# Patient Record
Sex: FEMALE | Race: BLACK OR AFRICAN AMERICAN | NOT HISPANIC OR LATINO
[De-identification: names, ages, dates, MRNs, and addresses within clinical notes are randomized per-mention and may not be internally consistent; named-entity substitution may affect disease eponyms.]

---

## 2019-04-16 ENCOUNTER — APPOINTMENT (OUTPATIENT)
Dept: ENDOCRINOLOGY | Facility: CLINIC | Age: 29
End: 2019-04-16

## 2020-04-26 ENCOUNTER — MESSAGE (OUTPATIENT)
Age: 30
End: 2020-04-26

## 2022-04-11 ENCOUNTER — INPATIENT (INPATIENT)
Facility: HOSPITAL | Age: 32
LOS: 0 days | Discharge: HOME | End: 2022-04-12
Attending: SURGERY | Admitting: SURGERY
Payer: COMMERCIAL

## 2022-04-11 ENCOUNTER — TRANSCRIPTION ENCOUNTER (OUTPATIENT)
Age: 32
End: 2022-04-11

## 2022-04-11 VITALS
HEART RATE: 121 BPM | DIASTOLIC BLOOD PRESSURE: 78 MMHG | OXYGEN SATURATION: 100 % | SYSTOLIC BLOOD PRESSURE: 121 MMHG | RESPIRATION RATE: 16 BRPM | TEMPERATURE: 97 F

## 2022-04-11 PROCEDURE — 99285 EMERGENCY DEPT VISIT HI MDM: CPT

## 2022-04-11 RX ORDER — KETOROLAC TROMETHAMINE 30 MG/ML
30 SYRINGE (ML) INJECTION ONCE
Refills: 0 | Status: DISCONTINUED | OUTPATIENT
Start: 2022-04-11 | End: 2022-04-11

## 2022-04-11 RX ORDER — SODIUM CHLORIDE 9 MG/ML
1000 INJECTION INTRAMUSCULAR; INTRAVENOUS; SUBCUTANEOUS ONCE
Refills: 0 | Status: COMPLETED | OUTPATIENT
Start: 2022-04-11 | End: 2022-04-11

## 2022-04-11 NOTE — ED ADULT TRIAGE NOTE - CHIEF COMPLAINT QUOTE
Pt arrives with diffuse R sided abd pain and R flank pain x1 day with associated vomiting. Sent by PCP for concern for appendicitis. Denies fevers, chills, urinary symptoms. LMP 3/30. Denies PMHx.

## 2022-04-12 ENCOUNTER — TRANSCRIPTION ENCOUNTER (OUTPATIENT)
Age: 32
End: 2022-04-12

## 2022-04-12 ENCOUNTER — RESULT REVIEW (OUTPATIENT)
Age: 32
End: 2022-04-12

## 2022-04-12 VITALS
OXYGEN SATURATION: 97 % | DIASTOLIC BLOOD PRESSURE: 68 MMHG | HEART RATE: 86 BPM | RESPIRATION RATE: 18 BRPM | SYSTOLIC BLOOD PRESSURE: 123 MMHG

## 2022-04-12 DIAGNOSIS — K35.80 UNSPECIFIED ACUTE APPENDICITIS: ICD-10-CM

## 2022-04-12 LAB
ALBUMIN SERPL ELPH-MCNC: 4 G/DL — SIGNIFICANT CHANGE UP (ref 3.3–5)
ALP SERPL-CCNC: 57 U/L — SIGNIFICANT CHANGE UP (ref 40–120)
ALT FLD-CCNC: 15 U/L — SIGNIFICANT CHANGE UP (ref 4–33)
ANION GAP SERPL CALC-SCNC: 9 MMOL/L — SIGNIFICANT CHANGE UP (ref 7–14)
APPEARANCE UR: CLEAR — SIGNIFICANT CHANGE UP
APTT BLD: 30.6 SEC — SIGNIFICANT CHANGE UP (ref 27–36.3)
AST SERPL-CCNC: 14 U/L — SIGNIFICANT CHANGE UP (ref 4–32)
B PERT DNA SPEC QL NAA+PROBE: SIGNIFICANT CHANGE UP
B PERT+PARAPERT DNA PNL SPEC NAA+PROBE: SIGNIFICANT CHANGE UP
BACTERIA # UR AUTO: NEGATIVE — SIGNIFICANT CHANGE UP
BASOPHILS # BLD AUTO: 0.03 K/UL — SIGNIFICANT CHANGE UP (ref 0–0.2)
BASOPHILS NFR BLD AUTO: 0.2 % — SIGNIFICANT CHANGE UP (ref 0–2)
BILIRUB SERPL-MCNC: 0.9 MG/DL — SIGNIFICANT CHANGE UP (ref 0.2–1.2)
BILIRUB UR-MCNC: NEGATIVE — SIGNIFICANT CHANGE UP
BLD GP AB SCN SERPL QL: NEGATIVE — SIGNIFICANT CHANGE UP
BORDETELLA PARAPERTUSSIS (RAPRVP): SIGNIFICANT CHANGE UP
BUN SERPL-MCNC: 7 MG/DL — SIGNIFICANT CHANGE UP (ref 7–23)
C PNEUM DNA SPEC QL NAA+PROBE: SIGNIFICANT CHANGE UP
CALCIUM SERPL-MCNC: 8.6 MG/DL — SIGNIFICANT CHANGE UP (ref 8.4–10.5)
CHLORIDE SERPL-SCNC: 103 MMOL/L — SIGNIFICANT CHANGE UP (ref 98–107)
CO2 SERPL-SCNC: 25 MMOL/L — SIGNIFICANT CHANGE UP (ref 22–31)
COLOR SPEC: YELLOW — SIGNIFICANT CHANGE UP
CREAT SERPL-MCNC: 0.94 MG/DL — SIGNIFICANT CHANGE UP (ref 0.5–1.3)
DIFF PNL FLD: NEGATIVE — SIGNIFICANT CHANGE UP
EGFR: 82 ML/MIN/1.73M2 — SIGNIFICANT CHANGE UP
EOSINOPHIL # BLD AUTO: 0.01 K/UL — SIGNIFICANT CHANGE UP (ref 0–0.5)
EOSINOPHIL NFR BLD AUTO: 0.1 % — SIGNIFICANT CHANGE UP (ref 0–6)
EPI CELLS # UR: 2 /HPF — SIGNIFICANT CHANGE UP (ref 0–5)
FLUAV SUBTYP SPEC NAA+PROBE: SIGNIFICANT CHANGE UP
FLUBV RNA SPEC QL NAA+PROBE: SIGNIFICANT CHANGE UP
GLUCOSE SERPL-MCNC: 105 MG/DL — HIGH (ref 70–99)
GLUCOSE UR QL: NEGATIVE — SIGNIFICANT CHANGE UP
HADV DNA SPEC QL NAA+PROBE: SIGNIFICANT CHANGE UP
HCOV 229E RNA SPEC QL NAA+PROBE: SIGNIFICANT CHANGE UP
HCOV HKU1 RNA SPEC QL NAA+PROBE: SIGNIFICANT CHANGE UP
HCOV NL63 RNA SPEC QL NAA+PROBE: SIGNIFICANT CHANGE UP
HCOV OC43 RNA SPEC QL NAA+PROBE: SIGNIFICANT CHANGE UP
HCT VFR BLD CALC: 35.4 % — SIGNIFICANT CHANGE UP (ref 34.5–45)
HGB BLD-MCNC: 11.8 G/DL — SIGNIFICANT CHANGE UP (ref 11.5–15.5)
HIV 1+2 AB+HIV1 P24 AG SERPL QL IA: SIGNIFICANT CHANGE UP
HMPV RNA SPEC QL NAA+PROBE: SIGNIFICANT CHANGE UP
HPIV1 RNA SPEC QL NAA+PROBE: SIGNIFICANT CHANGE UP
HPIV2 RNA SPEC QL NAA+PROBE: SIGNIFICANT CHANGE UP
HPIV3 RNA SPEC QL NAA+PROBE: SIGNIFICANT CHANGE UP
HPIV4 RNA SPEC QL NAA+PROBE: SIGNIFICANT CHANGE UP
HYALINE CASTS # UR AUTO: 1 /LPF — SIGNIFICANT CHANGE UP (ref 0–7)
IANC: 13 K/UL — HIGH (ref 1.8–7.4)
IMM GRANULOCYTES NFR BLD AUTO: 0.4 % — SIGNIFICANT CHANGE UP (ref 0–1.5)
INR BLD: 1.15 RATIO — SIGNIFICANT CHANGE UP (ref 0.88–1.16)
KETONES UR-MCNC: ABNORMAL
LEUKOCYTE ESTERASE UR-ACNC: ABNORMAL
LYMPHOCYTES # BLD AUTO: 13.2 % — SIGNIFICANT CHANGE UP (ref 13–44)
LYMPHOCYTES # BLD AUTO: 2.19 K/UL — SIGNIFICANT CHANGE UP (ref 1–3.3)
M PNEUMO DNA SPEC QL NAA+PROBE: SIGNIFICANT CHANGE UP
MCHC RBC-ENTMCNC: 30.1 PG — SIGNIFICANT CHANGE UP (ref 27–34)
MCHC RBC-ENTMCNC: 33.3 GM/DL — SIGNIFICANT CHANGE UP (ref 32–36)
MCV RBC AUTO: 90.3 FL — SIGNIFICANT CHANGE UP (ref 80–100)
MONOCYTES # BLD AUTO: 1.27 K/UL — HIGH (ref 0–0.9)
MONOCYTES NFR BLD AUTO: 7.7 % — SIGNIFICANT CHANGE UP (ref 2–14)
NEUTROPHILS # BLD AUTO: 13 K/UL — HIGH (ref 1.8–7.4)
NEUTROPHILS NFR BLD AUTO: 78.4 % — HIGH (ref 43–77)
NITRITE UR-MCNC: NEGATIVE — SIGNIFICANT CHANGE UP
NRBC # BLD: 0 /100 WBCS — SIGNIFICANT CHANGE UP
NRBC # FLD: 0 K/UL — SIGNIFICANT CHANGE UP
PH UR: 6 — SIGNIFICANT CHANGE UP (ref 5–8)
PLATELET # BLD AUTO: 241 K/UL — SIGNIFICANT CHANGE UP (ref 150–400)
POTASSIUM SERPL-MCNC: 3.9 MMOL/L — SIGNIFICANT CHANGE UP (ref 3.5–5.3)
POTASSIUM SERPL-SCNC: 3.9 MMOL/L — SIGNIFICANT CHANGE UP (ref 3.5–5.3)
PROT SERPL-MCNC: 7.2 G/DL — SIGNIFICANT CHANGE UP (ref 6–8.3)
PROT UR-MCNC: ABNORMAL
PROTHROM AB SERPL-ACNC: 13.4 SEC — SIGNIFICANT CHANGE UP (ref 10.5–13.4)
RAPID RVP RESULT: SIGNIFICANT CHANGE UP
RBC # BLD: 3.92 M/UL — SIGNIFICANT CHANGE UP (ref 3.8–5.2)
RBC # FLD: 11.8 % — SIGNIFICANT CHANGE UP (ref 10.3–14.5)
RBC CASTS # UR COMP ASSIST: 2 /HPF — SIGNIFICANT CHANGE UP (ref 0–4)
RH IG SCN BLD-IMP: POSITIVE — SIGNIFICANT CHANGE UP
RSV RNA SPEC QL NAA+PROBE: SIGNIFICANT CHANGE UP
RV+EV RNA SPEC QL NAA+PROBE: SIGNIFICANT CHANGE UP
SARS-COV-2 RNA SPEC QL NAA+PROBE: SIGNIFICANT CHANGE UP
SODIUM SERPL-SCNC: 137 MMOL/L — SIGNIFICANT CHANGE UP (ref 135–145)
SP GR SPEC: 1.02 — SIGNIFICANT CHANGE UP (ref 1–1.05)
T PALLIDUM AB TITR SER: NEGATIVE — SIGNIFICANT CHANGE UP
UROBILINOGEN FLD QL: SIGNIFICANT CHANGE UP
WBC # BLD: 16.56 K/UL — HIGH (ref 3.8–10.5)
WBC # FLD AUTO: 16.56 K/UL — HIGH (ref 3.8–10.5)
WBC UR QL: 5 /HPF — SIGNIFICANT CHANGE UP (ref 0–5)

## 2022-04-12 PROCEDURE — 74177 CT ABD & PELVIS W/CONTRAST: CPT | Mod: 26,MA

## 2022-04-12 PROCEDURE — ZZZZZ: CPT

## 2022-04-12 PROCEDURE — 99222 1ST HOSP IP/OBS MODERATE 55: CPT | Mod: 57

## 2022-04-12 PROCEDURE — 44970 LAPAROSCOPY APPENDECTOMY: CPT

## 2022-04-12 PROCEDURE — 88304 TISSUE EXAM BY PATHOLOGIST: CPT | Mod: 26

## 2022-04-12 DEVICE — STAPLER COVIDIEN TRI-STAPLE 45MM TAN RELOAD: Type: IMPLANTABLE DEVICE | Status: FUNCTIONAL

## 2022-04-12 RX ORDER — OXYCODONE HYDROCHLORIDE 5 MG/1
1 TABLET ORAL
Qty: 9 | Refills: 0
Start: 2022-04-12 | End: 2022-04-14

## 2022-04-12 RX ORDER — MORPHINE SULFATE 50 MG/1
4 CAPSULE, EXTENDED RELEASE ORAL ONCE
Refills: 0 | Status: DISCONTINUED | OUTPATIENT
Start: 2022-04-12 | End: 2022-04-12

## 2022-04-12 RX ORDER — SODIUM CHLORIDE 9 MG/ML
1000 INJECTION, SOLUTION INTRAVENOUS
Refills: 0 | Status: DISCONTINUED | OUTPATIENT
Start: 2022-04-12 | End: 2022-04-12

## 2022-04-12 RX ORDER — PIPERACILLIN AND TAZOBACTAM 4; .5 G/20ML; G/20ML
3.38 INJECTION, POWDER, LYOPHILIZED, FOR SOLUTION INTRAVENOUS ONCE
Refills: 0 | Status: COMPLETED | OUTPATIENT
Start: 2022-04-12 | End: 2022-04-12

## 2022-04-12 RX ORDER — ONDANSETRON 8 MG/1
4 TABLET, FILM COATED ORAL ONCE
Refills: 0 | Status: COMPLETED | OUTPATIENT
Start: 2022-04-12 | End: 2022-04-12

## 2022-04-12 RX ORDER — OXYCODONE HYDROCHLORIDE 5 MG/1
5 TABLET ORAL ONCE
Refills: 0 | Status: DISCONTINUED | OUTPATIENT
Start: 2022-04-12 | End: 2022-04-12

## 2022-04-12 RX ORDER — FENTANYL CITRATE 50 UG/ML
25 INJECTION INTRAVENOUS
Refills: 0 | Status: DISCONTINUED | OUTPATIENT
Start: 2022-04-12 | End: 2022-04-12

## 2022-04-12 RX ORDER — ENOXAPARIN SODIUM 100 MG/ML
40 INJECTION SUBCUTANEOUS EVERY 24 HOURS
Refills: 0 | Status: DISCONTINUED | OUTPATIENT
Start: 2022-04-12 | End: 2022-04-12

## 2022-04-12 RX ORDER — ONDANSETRON 8 MG/1
4 TABLET, FILM COATED ORAL ONCE
Refills: 0 | Status: DISCONTINUED | OUTPATIENT
Start: 2022-04-12 | End: 2022-04-12

## 2022-04-12 RX ORDER — ACETAMINOPHEN 500 MG
1000 TABLET ORAL EVERY 6 HOURS
Refills: 0 | Status: DISCONTINUED | OUTPATIENT
Start: 2022-04-12 | End: 2022-04-12

## 2022-04-12 RX ORDER — PIPERACILLIN AND TAZOBACTAM 4; .5 G/20ML; G/20ML
3.38 INJECTION, POWDER, LYOPHILIZED, FOR SOLUTION INTRAVENOUS EVERY 8 HOURS
Refills: 0 | Status: DISCONTINUED | OUTPATIENT
Start: 2022-04-12 | End: 2022-04-12

## 2022-04-12 RX ADMIN — OXYCODONE HYDROCHLORIDE 5 MILLIGRAM(S): 5 TABLET ORAL at 14:50

## 2022-04-12 RX ADMIN — SODIUM CHLORIDE 1000 MILLILITER(S): 9 INJECTION INTRAMUSCULAR; INTRAVENOUS; SUBCUTANEOUS at 00:38

## 2022-04-12 RX ADMIN — OXYCODONE HYDROCHLORIDE 5 MILLIGRAM(S): 5 TABLET ORAL at 14:17

## 2022-04-12 RX ADMIN — Medication 30 MILLIGRAM(S): at 01:22

## 2022-04-12 RX ADMIN — PIPERACILLIN AND TAZOBACTAM 200 GRAM(S): 4; .5 INJECTION, POWDER, LYOPHILIZED, FOR SOLUTION INTRAVENOUS at 00:39

## 2022-04-12 RX ADMIN — ONDANSETRON 4 MILLIGRAM(S): 8 TABLET, FILM COATED ORAL at 00:39

## 2022-04-12 RX ADMIN — MORPHINE SULFATE 4 MILLIGRAM(S): 50 CAPSULE, EXTENDED RELEASE ORAL at 06:40

## 2022-04-12 NOTE — ED ADULT NURSE REASSESSMENT NOTE - NS ED NURSE REASSESS COMMENT FT1
report received from overnight RN. pt is A&Ox4. ambulatory. NAD. respirations are even and un labored. safety precautions maintained.

## 2022-04-12 NOTE — ASU DISCHARGE PLAN (ADULT/PEDIATRIC) - NURSING INSTRUCTIONS
You received IV Tylenol for pain management at _1105__. Please DO NOT take any Tylenol (Acetaminophen) containing products, such as Vicodin, Percocet, Excedrin, and cold medications for the next 6 hours (until _505__ PM). DO NOT TAKE MORE THAN 3000 MG OF TYLENOL in a 24 hour period.

## 2022-04-12 NOTE — ED PROVIDER NOTE - ABDOMINAL EXAM
+ RLQ tenderness to palpation, no rebound or guarding + RLQ tenderness to palpation, +rebound, +rovsing, no guarding

## 2022-04-12 NOTE — ASU DISCHARGE PLAN (ADULT/PEDIATRIC) - ASU DC SPECIAL INSTRUCTIONSFT
Take pain medication as needed. A prescription has already been sent to your pharmacy. Don't drive if taking narcotics. No heavy lifting for 6-8 weeks. You may shower after 24 hours. You have white strips on your incisions called steri strips. These will fall off on their own or will be removed in office. Follow up with Dr. Jones in 1-2 weeks. Call to schedule an appointment. Take pain medication as needed. A prescription has already been sent to your pharmacy. Don't drive if taking narcotics. No heavy lifting for 6-8 weeks. You may shower after 24 hours. You have white strips on your incisions called steri strips. These will fall off on their own or will be removed in office. Follow up with Dr. Jones in 1-2 weeks. Call to schedule an appointment. DO NOT take any Tylenol (Acetaminophen) or narcotics containing Tylenol until after  5pm______ . You received Tylenol during your operation and it can cause damage to your liver if too much is taken within a 24 hour time period.

## 2022-04-12 NOTE — H&P ADULT - ASSESSMENT
To OR for lap appendectomy.    Full H&P to follow    Patient discussed with attending, Dr. Chhaya MD  PGY2 Consult Resident  [facility] (Acute Care Surgery)  [id number]   ASSESSMENT/PLAN: 31y F with no significant past medical history presenting with acute appendicitis. CT with evidence of small perforation but no phlegmon or abscess.     - Admit to [facility] Surgery  - Booked/consented for add-on lap appy today  - NPO  - IVF  - IV Abx: Zosyn      Patient discussed with attending, Dr. Chhaya MD  PGY2 Consult Resident  B Team Surgery (Acute Care Surgery)  [id number]

## 2022-04-12 NOTE — ASU DISCHARGE PLAN (ADULT/PEDIATRIC) - NS MD DC FALL RISK RISK
For information on Fall & Injury Prevention, visit: [email or web address] OR  [email or web address] OR  https://www.cdc.gov/steadi/patient.html

## 2022-04-12 NOTE — ED PROVIDER NOTE - PHYSICAL EXAMINATION
: JONELLE Loera chaperoned by JONELLE Singleton. No adnexal tenderness, trace clear discharge on bimanual exam.

## 2022-04-12 NOTE — ED PROVIDER NOTE - CONSTITUTIONAL, MLM
normal... Uncomfortable, awake, alert, oriented to person, place, time/situation and in no immediate distress.

## 2022-04-12 NOTE — H&P ADULT - ATTENDING COMMENTS
Acute Appendicitis  a.  Admit to B surgery/[facility]  b.  Nil per OS  c.  Start IVF  d.  Start IV antibiotics  e.  DVT prophylaxis with Lovenox  f.   Plan for laparoscopic appendectomy.  I have discussed the risks which include but are not limited to bleeding, surgical site infections, injury to adjacent viscera.  Also discussed were the benefits and the alternatives which include antibiotic regimen, with or without interval appendectomy, prognosis and expected recovery from disease process

## 2022-04-12 NOTE — ASU DISCHARGE PLAN (ADULT/PEDIATRIC) - FOLLOW UP APPOINTMENTS
[phone] may also call Recovery Room (PACU) 24/7 @ [phone]/[facility]

## 2022-04-12 NOTE — H&P ADULT - NSHPLABSRESULTS_GEN_ALL_CORE
LABS:                        11.8   16.56 )-----------( 241      ( 2022 00:25 )             35.4     04-12    137  |  103  |  7   ----------------------------<  105<H>  3.9   |  25  |  0.94    Ca    8.6      2022 00:25  Phos  2.4     04-12  Mg     1.80     04-12    TPro  7.2  /  Alb  4.0  /  TBili  0.9  /  DBili  x   /  AST  14  /  ALT  15  /  AlkPhos  57  04-12    PT/INR - ( 2022 00:25 )   PT: 13.4 sec;   INR: 1.15 ratio         PTT - ( 2022 00:25 )  PTT:30.6 sec  Urinalysis Basic - ( 2022 01:03 )    Color: Yellow / Appearance: Clear / S.024 / pH: x  Gluc: x / Ketone: Trace  / Bili: Negative / Urobili: <2 mg/dL   Blood: x / Protein: Trace / Nitrite: Negative   Leuk Esterase: Small / RBC: 2 /HPF / WBC 5 /HPF   Sq Epi: x / Non Sq Epi: 2 /HPF / Bacteria: Negative      CAPILLARY BLOOD GLUCOSE        LIVER FUNCTIONS - ( 2022 00:25 )  Alb: 4.0 g/dL / Pro: 7.2 g/dL / ALK PHOS: 57 U/L / ALT: 15 U/L / AST: 14 U/L / GGT: x                 RADIOLOGY & ADDITIONAL STUDIES:  < from: CT Abdomen and Pelvis w/ IV Cont (22 @ 05:42) >  FINDINGS:  LOWER CHEST:Within normal limits.    LIVER: Within normal limits.  BILE DUCTS: Normal caliber.  GALLBLADDER: Within normal limits.  SPLEEN: Within normal limits.  PANCREAS: Within normal limits.  ADRENALS: Within normal limits.  KIDNEYS/URETERS: Within normal limits.    BLADDER: Within normal limits.  REPRODUCTIVE ORGANS: Asymmetrically enlarged left adnexa measures 3.9 cm,   possibly due to the presence of an ovarian cyst. Uterus and right adnexa   within normal limits.    BOWEL: No bowel obstruction. Dilated fluid-filled hyperemic thick-walled   appendix measures up to 11 mm at the tip. Suggestion of focal   discontinuity of the wall of the appendiceal tip with surrounding   inflammatory change. No extraluminal gas.  PERITONEUM: No abscess, free air, or ascites.  VESSELS: Within normal limits.  RETROPERITONEUM/LYMPH NODES: No lymphadenopathy.  ABDOMINAL WALL: Postsurgical changes.  BONES: Within normal limits.    IMPRESSION:  Acute appendicitis. Suspect focal perforation of the appendiceal tip. No   abscess or free air.    Asymmetrically enlarged left adnexa may be due to the presence of a left   ovarian cyst.    < end of copied text >

## 2022-04-12 NOTE — ED PROVIDER NOTE - ATTENDING CONTRIBUTION TO CARE
I have personally performed a history and physical examination of the patient and discussed management with the TR as well as the patient.  I reviewed the TR's note and agree with the documented findings and plan of care.  I have authored and modified critical sections of the Provider Note, including but not limited to HPI, Physical Exam and MDM.    32 Y/O F denies PMH or PSH states that she has had R sided abdominal pain since yesterday. Pt states the pain woke her up from sleep. Likely appendicitis, obtain CT to eval for acute abdominal pathology, less, cbc, cmp, lipase, lactate. Will give Zosyn due to high pre-test probability of appendicitis. Consider US if CT equivocal, less likely GYN etiology including ovarian torsion, pid, or ectopic pregnancy. Obtain ua, upreg.

## 2022-04-12 NOTE — ED PROVIDER NOTE - HIV OFFER
I have reviewed and confirmed nurses' notes for patient's medications, allergies, medical history, and surgical history. Yes, get tested

## 2022-04-12 NOTE — ED ADULT NURSE NOTE - OBJECTIVE STATEMENT
pt received to intake room 8 c/o RLQ abdominal pain since 2am Monday and vomiting. Sent by PCP for concern for appendicitis. Denies fevers, chills, urinary symptoms. LMP 3/30. 20G IV placed left AC, labs sent. pt in restroom for urine sample.

## 2022-04-12 NOTE — ED PROVIDER NOTE - NS ED ATTENDING STATEMENT MOD
This was a shared visit with the TR. I reviewed and verified the documentation and independently performed the documented:

## 2022-04-12 NOTE — ASU DISCHARGE PLAN (ADULT/PEDIATRIC) - CARE PROVIDER_API CALL
Mario Jones)  Surgery; Surgical Critical Care  [address] [place] [address]  Phone: [phone]  Fax: [phone]  Follow Up Time: 2 weeks

## 2022-04-12 NOTE — ED PROVIDER NOTE - CLINICAL SUMMARY MEDICAL DECISION MAKING FREE TEXT BOX
32 Y/O F denies PMH or PSH states that she has had R sided abdominal pain since yesterday. Pt states the pain woke her up from sleep. Pt states she is sexually active with one partner. + RLQ tenderness, will CT to eval for acute abdominal pathology, labs ordered to eval for anemia or electrolyte disturbance. Will give Zosyn due to high pre-test probability of appendicitis.

## 2022-04-12 NOTE — ED PROVIDER NOTE - OBJECTIVE STATEMENT
32 Y/O F denies PMH or pSH states that she has had R sided abdominal pain since yesterday. Pt states the pain woke her up from sleep. Pt states she is sexually active with one partner. Pt denies nausea, vomiting, fever, chills or nightsweats. Pt was offered and agrees to STI testing. Pt denies any other sx or acute complaints. 30 Y/O F denies PMH or pSH states that she has had R sided sharp abdominal pain since yesterday. Pt states the pain woke her up from sleep. Pt states she is sexually active with one partner. Pt denies nausea, vomiting, fever, chills or nightsweats. Pain began in the mid abdomen and now has radiated to the RLQ. Pt was offered and agrees to STI testing. Pt denies any other sx or acute complaints.

## 2022-04-12 NOTE — H&P ADULT - HISTORY OF PRESENT ILLNESS
31y  with no past medical or surgical history presents with 1 day of worsening RLQ abdominal pain. Patient states pain started in upper abdomen yesterday morning, but migrated to her RLQ in the afternoon. Associated with NBNB emesis.  31y F with no past medical or surgical history presents with 1 day of worsening RLQ abdominal pain. Patient states a sharp pain started in upper abdomen yesterday morning, but migrated to her RLQ in the afternoon and has been increasing in intensity. Associated with NBNB emesis, nausea, dizziness. Last BM yesterday but small, passing flatus. Denies fevers, chills, chest pain, SOB, diarrhea, melena, hematochezia, dysuria, urinary frequency, recent sick contacts, prior episodes of similar pain.

## 2022-04-12 NOTE — H&P ADULT - NSHPPHYSICALEXAM_GEN_ALL_CORE
Physical Exam:  General: NAD, resting comfortably  Neuro: A/O x 3, CNs II-XII grossly intact, normal sensation, no focal deficits  HEENT: NC/AT, EOMI, normal hearing, no oral lesions, no LAD, neck supple  Pulmonary: normal resp effort on RA  Cardiovascular: NSR, no murmurs  Abdominal: soft, non distended, tender to palpation in RLQ, (+) Rovsing's sign  Extremities: WWP, normal strength, no clubbing/cyanosis/edema  Pulses: palpable distal pulses        Vital Signs Last 24 Hrs  T(C): 37 (12 Apr 2022 06:09), Max: 37.2 (11 Apr 2022 22:46)  T(F): 98.6 (12 Apr 2022 06:09), Max: 99 (11 Apr 2022 22:46)  HR: 88 (12 Apr 2022 06:09) (88 - 121)  BP: 106/65 (12 Apr 2022 06:09) (106/65 - 130/76)  BP(mean): --  RR: 16 (12 Apr 2022 06:09) (16 - 16)  SpO2: 100% (12 Apr 2022 06:09) (100% - 100%)    I&O's Summary

## 2022-04-13 LAB
C TRACH RRNA SPEC QL NAA+PROBE: SIGNIFICANT CHANGE UP
CULTURE RESULTS: SIGNIFICANT CHANGE UP
N GONORRHOEA RRNA SPEC QL NAA+PROBE: SIGNIFICANT CHANGE UP
SPECIMEN SOURCE: SIGNIFICANT CHANGE UP
SPECIMEN SOURCE: SIGNIFICANT CHANGE UP

## 2022-04-15 PROBLEM — Z78.9 OTHER SPECIFIED HEALTH STATUS: Chronic | Status: ACTIVE | Noted: 2022-04-12

## 2022-04-19 LAB — SURGICAL PATHOLOGY STUDY: SIGNIFICANT CHANGE UP

## 2022-04-22 ENCOUNTER — APPOINTMENT (OUTPATIENT)
Dept: TRAUMA SURGERY | Facility: HOSPITAL | Age: 32
End: 2022-04-22
Payer: COMMERCIAL

## 2022-04-22 VITALS
BODY MASS INDEX: 34.95 KG/M2 | TEMPERATURE: 98.6 F | HEART RATE: 95 BPM | HEIGHT: 60 IN | WEIGHT: 178 LBS | SYSTOLIC BLOOD PRESSURE: 128 MMHG | DIASTOLIC BLOOD PRESSURE: 83 MMHG

## 2022-04-22 DIAGNOSIS — K35.30 ACUTE APPENDICITIS W/ LOCALIZED PERITONITIS, W/O PERFORATION OR GANGRENE: ICD-10-CM

## 2022-04-22 DIAGNOSIS — N93.9 ABNORMAL UTERINE AND VAGINAL BLEEDING, UNSPECIFIED: ICD-10-CM

## 2022-04-22 PROCEDURE — 99024 POSTOP FOLLOW-UP VISIT: CPT

## 2022-04-22 NOTE — ASSESSMENT
[FreeTextEntry1] : S/P laparoscopic appendectomy\par \par Patient with note of breakthrough vaginal bleeding x few days, called service and was reassured by on-call attending.  No further bleeding was noted.  She noted some RLQ pain immediately post-op which was self limited.  She denies pain, fever, and chills at present.  She is tolerating a diet without nausea nor vomiting.  She has noted some loose bowel  movements after surgery which have subsequently resolved.  ROS otherwise within normal.\par \par She recalls hearing voices for 10 - 20 seconds at some point.  She noted she was paralyzed.  I discussed with her that this was likely during pre-induction as she was under general anesthesia and underwent a TEP block before we started the appendectomy.  I will discuss case with anesthesia team for review.\par \par She is alert, oriented not in distress\par Incision clean, dry and intact.  Soft nontender \par \par Pathology discussed\par \par 1.  No restrictions to activity or diet\par 2.  Recommend GYN evaluation for breakthrough bleeding and CT finding of ovarian cyst.  A copy of the CT report was given to the patient.  She understood.\par 3.  She has inquired about timing for elective plastic surgery and I suggested 6-8 weeks from appendectomy date would suffice.  I encouraged her to reach out to plastic surgeon for his preference but unlikely any absolute contraindication from the general surgical point of view.\par 4.  Follow up as needed\par \par  \par

## 2022-11-15 ENCOUNTER — APPOINTMENT (OUTPATIENT)
Dept: TRAUMA SURGERY | Facility: HOSPITAL | Age: 32
End: 2022-11-15

## 2022-11-15 VITALS
HEART RATE: 97 BPM | BODY MASS INDEX: 35.53 KG/M2 | TEMPERATURE: 97.3 F | WEIGHT: 181 LBS | DIASTOLIC BLOOD PRESSURE: 76 MMHG | SYSTOLIC BLOOD PRESSURE: 123 MMHG | HEIGHT: 60 IN

## 2022-11-15 DIAGNOSIS — N97.9 FEMALE INFERTILITY, UNSPECIFIED: ICD-10-CM

## 2022-11-15 DIAGNOSIS — E28.2 POLYCYSTIC OVARIAN SYNDROME: ICD-10-CM

## 2022-11-15 DIAGNOSIS — R10.11 RIGHT UPPER QUADRANT PAIN: ICD-10-CM

## 2022-11-15 DIAGNOSIS — R10.31 RIGHT LOWER QUADRANT PAIN: ICD-10-CM

## 2022-11-15 PROCEDURE — 99215 OFFICE O/P EST HI 40 MIN: CPT

## 2022-11-15 NOTE — ASSESSMENT
[FreeTextEntry1] : Problem :  Abdominal pain\par \par Patient is status appendectomy (4/2022) for acute appendicitis with peritonitis.  She presents with RLQ pain.\par \par Pain is situated in the right jesus abdomen, nonradiating, described as sharp.  This is not associated with fever, nausea, vomiting or changes to her normal bowel habits.  On further inquiry, it is not related to activity/food intake or menstrual cycle.  Limited review of systems as follows\par \par She denies malaise. weakness , paresthesias.  No change in range of motion.  Notes recent weight gain attributes to decrease in exercise\par She denies chest pain, angina, shortness of breath\par She no longer complains of breakthrough vaginal bleeding.  She discussed recent attempts to conceive.  She notes recent transvaginal ultrasound where her Fallopian tube is stuck to her uterus.  Also notes recent hysterosalpingogram but unclear with actual findings.  She was concerned with adhesions in her pelvis and inquire regarding it relationship to her appendectomy.  Also, She notes recent diagnosis of PCOS and she is started on metformin.  She is unclear of recent use of OCPs.  Initially said no, then said yes.  Will try to obtain recent prescription history.\par She denies flank pain, hematuria, lithuria, fecaluria or dysuria\par Also she did not pursue plans for cosmetic plastic surgery as previously discussed\par \par Physical examination reveals\par \par She is awake, alert not in distress, does not appear uncomfortable\par She has anicteric sclera, pink conjunctiva\par Abdominal exam reveals incision healed appropriately, soft , nontender, no organomegaly noted,  no hernias noted.  Questionable flank tenderness on exam, on repeat could not elicit tenderness\par \par Diagnosis : abdominal pain, infertility and Polycystic ovarian syndrome\par \par 1.  Patient with vague abdominal pain with no obvious etiology.  I discussed that adhesions related to previous surgery are rarely symptomatic.  Concerns for intestinal obstructions are more commonly seen in open appendectomy.  I have low suspicion for intestinal obstruction\par 2.  It is my opinion and experience that infertility is not related to history of appendectomy.  Ectopic pregnancy may have an association whatsoever.  As the patient  was concerned with adhesions, I explained that is the normal process of healing after surgery\par 3.  I will discuss with her infertility doctor.  Dr. Ring [phone] regarding her concerns related to previous surgery.  She claims she came to see me  regarding her concern for abdominal pain but the majority of her concerns were centered on how the history of  surgery might be related to her fallopian tube and infertility.  Also obtained phone number for her PCP Dong Hernandez [phone] for any concerns She is unclear of both MD surname, spelling.\par 4.  We discussed obtaining a CT scan for possible etiology of abdominal pain.  She agrees and will obtain authorization and plan for follow up after it is performed.  Note of a possible left ovarian cyst and need form further gynecologic evaluation was discussed at last visit.  \par 5.  If pain worsens, instruction for ED or follow up asap . Need for further subspecialty consultations and workup was also discussed \par \par \par

## 2022-11-25 ENCOUNTER — APPOINTMENT (OUTPATIENT)
Dept: CT IMAGING | Facility: IMAGING CENTER | Age: 32
End: 2022-11-25

## 2022-11-25 ENCOUNTER — OUTPATIENT (OUTPATIENT)
Dept: OUTPATIENT SERVICES | Facility: HOSPITAL | Age: 32
LOS: 1 days | End: 2022-11-25
Payer: COMMERCIAL

## 2022-11-25 ENCOUNTER — RESULT REVIEW (OUTPATIENT)
Age: 32
End: 2022-11-25

## 2022-11-25 DIAGNOSIS — Z00.8 ENCOUNTER FOR OTHER GENERAL EXAMINATION: ICD-10-CM

## 2022-11-25 PROCEDURE — 74177 CT ABD & PELVIS W/CONTRAST: CPT | Mod: 26

## 2022-11-25 PROCEDURE — 74177 CT ABD & PELVIS W/CONTRAST: CPT

## 2023-07-29 ENCOUNTER — OFFICE (OUTPATIENT)
Dept: URBAN - METROPOLITAN AREA CLINIC 35 | Facility: CLINIC | Age: 33
Setting detail: OPHTHALMOLOGY
End: 2023-07-29
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
## 2023-07-29 PROCEDURE — NO SHOW FE NO SHOW FEE: Performed by: OPHTHALMOLOGY

## (undated) DEVICE — D HELP - CLEARVIEW CLEARIFY SYSTEM

## (undated) DEVICE — DRSG TEGADERM 2.5X3"

## (undated) DEVICE — LIGASURE BLUNT TIP 37CM

## (undated) DEVICE — TUBING INSUFFLATION LAP FILTER 10FT

## (undated) DEVICE — BASIN SET SINGLE

## (undated) DEVICE — LIGASURE IMPACT

## (undated) DEVICE — DRSG STERISTRIPS 0.5 X 4"

## (undated) DEVICE — TUBING OLYMPUS INSUFFLATION

## (undated) DEVICE — SUT MONOCRYL 4-0 27" PS-2 UNDYED

## (undated) DEVICE — CANISTER DISPOSABLE THIN WALL 3000CC

## (undated) DEVICE — STAPLER COVIDIEN ENDO GIA STANDARD HANDLE

## (undated) DEVICE — TIP METZENBAUM SCISSOR MONOPOLAR ENDOCUT (ORANGE)

## (undated) DEVICE — TROCAR COVIDIEN BLUNT TIP HASSAN 12MM

## (undated) DEVICE — WARMING BLANKET FULL ADULT

## (undated) DEVICE — ELCTR GROUNDING PAD ADULT COVIDIEN

## (undated) DEVICE — PACK GENERAL LAPAROSCOPY

## (undated) DEVICE — PROTECTOR HEEL / ELBOW FLUFFY

## (undated) DEVICE — DRSG BENZOIN 0.6CC

## (undated) DEVICE — SOL IRR POUR H2O 500ML

## (undated) DEVICE — POSITIONER STRAP ARMBOARD VELCRO TS-30

## (undated) DEVICE — ENDOCATCH 10MM SPECIMEN POUCH

## (undated) DEVICE — DRSG TELFA 3 X 8

## (undated) DEVICE — TUBING HYDRO-SURG PLUS IRRIGATOR W SMOKEVAC & PROBE

## (undated) DEVICE — SUT VICRYL 0 27" UR-6

## (undated) DEVICE — BLADE SURGICAL #15 CARBON

## (undated) DEVICE — GLV 7.5 PROTEXIS (CREAM) MICRO

## (undated) DEVICE — DISSECTOR ENDOSCOPIC KITTNER SINGLE TIP

## (undated) DEVICE — TROCAR COVIDIEN VERSAPORT BLADELESS OPTICAL 5MM STANDARD

## (undated) DEVICE — VENODYNE/SCD SLEEVE CALF MEDIUM

## (undated) DEVICE — SOL IRR POUR NS 0.9% 500ML